# Patient Record
Sex: FEMALE | Race: WHITE | HISPANIC OR LATINO | URBAN - METROPOLITAN AREA
[De-identification: names, ages, dates, MRNs, and addresses within clinical notes are randomized per-mention and may not be internally consistent; named-entity substitution may affect disease eponyms.]

---

## 2021-04-30 ENCOUNTER — OFFICE VISIT (OUTPATIENT)
Dept: OBGYN CLINIC | Facility: CLINIC | Age: 36
End: 2021-04-30

## 2021-04-30 VITALS
SYSTOLIC BLOOD PRESSURE: 120 MMHG | DIASTOLIC BLOOD PRESSURE: 70 MMHG | WEIGHT: 230 LBS | BODY MASS INDEX: 34.07 KG/M2 | HEIGHT: 69 IN

## 2021-04-30 DIAGNOSIS — D50.8 IRON DEFICIENCY ANEMIA SECONDARY TO INADEQUATE DIETARY IRON INTAKE: ICD-10-CM

## 2021-04-30 DIAGNOSIS — N92.6 IRREGULAR MENSES: Primary | ICD-10-CM

## 2021-04-30 DIAGNOSIS — Z97.5 NEXPLANON IN PLACE: ICD-10-CM

## 2021-04-30 PROCEDURE — 99203 OFFICE O/P NEW LOW 30 MIN: CPT | Performed by: STUDENT IN AN ORGANIZED HEALTH CARE EDUCATION/TRAINING PROGRAM

## 2021-04-30 RX ORDER — DOCUSATE SODIUM 100 MG/1
CAPSULE, LIQUID FILLED ORAL
COMMUNITY
Start: 2021-03-04

## 2021-04-30 RX ORDER — SODIUM FLUORIDE 6 MG/ML
PASTE, DENTIFRICE DENTAL
COMMUNITY
Start: 2021-03-18

## 2021-04-30 RX ORDER — FERROUS SULFATE 325(65) MG
1 TABLET ORAL 3 TIMES DAILY
COMMUNITY
Start: 2021-03-29

## 2021-04-30 NOTE — ASSESSMENT & PLAN NOTE
- Reviewed irregular bleeding common in setting of Nexplanon  If heavy bleeding should recur, discussed option of estrogen add-back therapy with OCP x 2-3 cycles versus removal of Nexplanon and switching to another contraceptive method such as OCP or IUD  Patient does not wish to pursue medical management at this time, as cycles have improved over the past 2 months  - Return to office in 2-3 months for annual exam  Patient will keep menstrual diary until that time for review at her next visit

## 2021-04-30 NOTE — PROGRESS NOTES
[address] [facility], [address], [facility], [address] [facility] [address]    Assessment/Plan:  1  Irregular menses  Assessment & Plan:  - Reviewed irregular bleeding common in setting of Nexplanon  If heavy bleeding should recur, discussed option of estrogen add-back therapy with OCP x 2-3 cycles versus removal of Nexplanon and switching to another contraceptive method such as OCP or IUD  Patient does not wish to pursue medical management at this time, as cycles have improved over the past 2 months  - Return to office in 2-3 months for annual exam  Patient will keep menstrual diary until that time for review at her next visit  2  Nexplanon in place    3  Iron deficiency anemia secondary to inadequate dietary iron intake  Assessment & Plan:  - Patient reports adequate response to oral iron supplementation (Hgb 7 -> 10)  Will continue  Subjective:   Bonnie Guerra is a 31 y o  Benjamin Fulton   CC:   Chief Complaint   Patient presents with    Gynecologic Exam     Pt states periods have been heavy in the past three months with changing pads every hour  - now period is normal  - pt has had bariatric surgery in the past and also has Nexplanon implanted in 2019       HPI: Patient presents for discussion of management of irregular bleeding  Reports that her hemoglobin was in the 7 range when checked by her PCP  She was subsequently started on oral iron supplementation and reports that hemoglobin improved to the 10 range  She denies fatigue, dizziness, diaphoresis, palpitations  She reports that period have been irregular since placement of Nexplanon in 2019  At times prolonged and heavy  However, she has had two subjectively normal cycles most recently  Currently without bleeding  ROS: Negative except as noted in HPI    The following portions of the patient's history were reviewed and updated as appropriate:   No past medical history on file    Past Surgical History:   Procedure Laterality Date    ANKLE FRACTURE SURGERY Left 2006    LAPAROSCOPIC ARIN-EN-Y RECONSTRUCTION  05/2019     Family History   Problem Relation Age of Onset    Breast cancer Neg Hx     Ovarian cancer Neg Hx     Uterine cancer Neg Hx     Colon cancer Neg Hx      Social History     Socioeconomic History    Marital status: Single     Spouse name: None    Number of children: None    Years of education: None    Highest education level: None   Occupational History    None   Social Needs    Financial resource strain: Not hard at all   [address] insecurity     Worry: Never true     Inability: Never true    Transportation needs     Medical: No     Non-medical: No   Tobacco Use    Smoking status: Never Smoker    Smokeless tobacco: Never Used   Substance and Sexual Activity    Alcohol use: Yes     Frequency: Monthly or less     Drinks per session: 1 or 2     Binge frequency: Never    Drug use: Never    Sexual activity: Yes     Partners: Male     Birth control/protection: Implant   Lifestyle    Physical activity     Days per week: 0 days     Minutes per session: 0 min    Stress: Not at all   Relationships    Social connections     Talks on phone: More than three times a week     Gets together: More than three times a week     Attends Jewish service: Never     Active member of club or organization: No     Attends meetings of clubs or organizations: Never     Relationship status: Never     Intimate partner violence     Fear of current or ex partner: No     Emotionally abused: No     Physically abused: No     Forced sexual activity: No   Other Topics Concern    None   Social History Narrative    None     No outpatient medications have been marked as taking for the 4/30/21 encounter (Office Visit) with Jack Ahuja MD      Allergies   Allergen Reactions    Shrimp (Diagnostic) - Food Allergy Facial Swelling           Objective:  /70   Ht 5' 9" (1 753 m)   Wt 104 kg (230 lb)   LMP 04/12/2021   BMI 33 97 kg/m² General Appearance: alert and oriented, in no acute distress     Skin: normal, no rash or abnormalities  Neurologic: alert, oriented x3  Psychiatric: Appropriate affect, mood stable, cooperative with exam         Leighann Shafer MD  4/30/2021 1:35 PM

## 2021-06-28 ENCOUNTER — ANNUAL EXAM (OUTPATIENT)
Dept: OBGYN CLINIC | Facility: CLINIC | Age: 36
End: 2021-06-28

## 2021-06-28 VITALS
WEIGHT: 230.4 LBS | SYSTOLIC BLOOD PRESSURE: 130 MMHG | DIASTOLIC BLOOD PRESSURE: 84 MMHG | BODY MASS INDEX: 34.13 KG/M2 | HEIGHT: 69 IN

## 2021-06-28 DIAGNOSIS — Z12.4 SCREENING FOR CERVICAL CANCER: Primary | ICD-10-CM

## 2021-06-28 PROCEDURE — 99395 PREV VISIT EST AGE 18-39: CPT | Performed by: STUDENT IN AN ORGANIZED HEALTH CARE EDUCATION/TRAINING PROGRAM

## 2021-06-28 RX ORDER — ETONOGESTREL 68 MG/1
68 IMPLANT SUBCUTANEOUS ONCE
COMMUNITY
Start: 2019-10-14

## 2021-06-28 RX ORDER — PREDNISOLONE ACETATE 10 MG/ML
SUSPENSION/ DROPS OPHTHALMIC
COMMUNITY
Start: 2021-05-26

## 2021-06-28 NOTE — PROGRESS NOTES
[id number]   [address] [facility] [address], [place], [place]    ASSESSMENT/PLAN: Edenilson Parsons is a 31 y o  Carmelita Burnette who presents for annual gynecologic exam     Encounter for routine gynecologic examination  - Routine well woman exam completed today  - Cervical Cancer Screening: Current ASCCP Guidelines reviewed  Last Pap: Pt unsure  History of abnormal: None  - HPV Vaccination status: Not immunized  - STI screening offered including HIV testing: offered, pt declined  - Contraceptive counseling discussed  Current contraception: Implant:   - The following were reviewed in today's visit: breast self exam, exercise and healthy diet    Additional problems addressed during this visit:  1  Screening for cervical cancer  -     Thinprep Tis Pap and HPV mRNA E6/E7 Reflex HPV 16,18/45        CC:  Annual Gynecologic Examination    HPI: Edenilson Parsons is a 31 y o  Carmelita Burnette who presents for annual gynecologic examination  ROS: Negative except as noted in HPI    Patient's last menstrual period was 06/09/2021  Menstrual History  Menarche Age: 17 years  Period Cycle (Days): 28  Period Pattern: Regular  Menstrual Flow: Moderate  Menstrual Control: Tampon  Dysmenorrhea: (!) Moderate    She  reports being sexually active and has had partner(s) who are Male  She reports using the following method of birth control/protection: Implant    Sexual History  Sexual Assault: No  Number of Partners in Last Year: 1 years  Sexually Transmitted Infection History:  (Trichomonas)  Multiple Sex Partners: No    The following portions of the patient's history were reviewed and updated as appropriate:   Past Medical History:   Diagnosis Date    COVID-19 04/09/2021     Past Surgical History:   Procedure Laterality Date    ANKLE FRACTURE SURGERY Left 2006    GASTRIC BYPASS  05/2019    LAPAROSCOPIC ARIN-EN-Y RECONSTRUCTION  05/2019     Family History   Problem Relation Age of Onset    Breast cancer Neg Hx     Ovarian cancer Neg Hx  Uterine cancer Neg Hx     Colon cancer Neg Hx      Social History     Socioeconomic History    Marital status: Single     Spouse name: None    Number of children: None    Years of education: None    Highest education level: None   Occupational History    None   Tobacco Use    Smoking status: Never Smoker    Smokeless tobacco: Never Used   Vaping Use    Vaping Use: Never used   Substance and Sexual Activity    Alcohol use: Yes     Comment: social    Drug use: Never    Sexual activity: Yes     Partners: Male     Birth control/protection: Implant   Other Topics Concern    None   Social History Narrative    None     Social Determinants of Health     Financial Resource Strain: Low Risk     Difficulty of Paying Living Expenses: Not hard at all   Food Insecurity: No Food Insecurity    Worried About Running Out of Food in the Last Year: Never true    Irma of Food in the Last Year: Never true   Transportation Needs: No Transportation Needs    Lack of Transportation (Medical): No    Lack of Transportation (Non-Medical):  No   Physical Activity: Inactive    Days of Exercise per Week: 0 days    Minutes of Exercise per Session: 0 min   Stress: No Stress Concern Present    Feeling of Stress : Not at all   Social Connections: Socially Isolated    Frequency of Communication with Friends and Family: More than three times a week    Frequency of Social Gatherings with Friends and Family: More than three times a week    Attends Congregation Services: Never    Active Member of Clubs or Organizations: No    Attends Club or Organization Meetings: Never    Marital Status: Never    Intimate Partner Violence: Not At Risk    Fear of Current or Ex-Partner: No    Emotionally Abused: No    Physically Abused: No    Sexually Abused: No     Outpatient Medications Marked as Taking for the 6/28/21 encounter (Annual Exam) with Huong Alvarado MD   Medication     MG capsule    etonogestrel (Nexplanon) subdermal implant    FeroSul 325 (65 Fe) MG tablet    prednisoLONE acetate (PRED FORTE) 1 % ophthalmic suspension    PreviDent 5000 Booster Plus 1 1 % PSTE     Allergies   Allergen Reactions    Shrimp (Diagnostic) - Food Allergy Facial Swelling           Objective:  /84   Ht 5' 9 25" (1 759 m)   Wt 105 kg (230 lb 6 4 oz)   LMP 06/09/2021   Breastfeeding No   BMI 33 78 kg/m²        Chaperone present? Yes: Lindsey Quitman  General Appearance: alert and oriented, in no acute distress  Neck/Thyroid: No thyromegaly, no thyroid nodules  Respiratory: Unlabored breathing  Cardiovascular: Regular rate, no peripheral edema  Abdomen: Soft, non-tender, non-distended, no masses, no rebound or guarding  Breast Exam: No dimpling, nipple retraction or discharge  No lumps or masses  No axillary or supraclavicular nodes  Pelvic:       External genitalia: Normal appearance, no abnormal pigmentation, no lesions or masses  Normal Bartholin's and [place]'s  Urinary system: Urethral meatus normal, bladder non-tender  Vaginal: normal mucosa without prolapse or lesions  Normal-appearing physiologic discharge  Cervix: Normal-appearing, well-epithelialized, no gross lesions or masses  No cervical motion tenderness  Adnexa: No adnexal masses or tenderness noted  Uterus: Normal-sized, regular contour, midline, mobile, no uterine tenderness  Extremities: Normal range of motion  Warm, well-perfused, non-tender     Skin: normal, no rash or abnormalities  Neurologic: alert, oriented x3  Psychiatric: Appropriate affect, mood stable, cooperative with exam         Roseann Alford MD  6/28/2021 1:17 PM

## 2021-06-30 ENCOUNTER — TELEPHONE (OUTPATIENT)
Dept: OBGYN CLINIC | Facility: CLINIC | Age: 36
End: 2021-06-30

## 2021-06-30 LAB
CLINICAL INFO: ABNORMAL
CYTO CVX: ABNORMAL
DATE PREVIOUS BX: ABNORMAL
HPV E6+E7 MRNA CVX QL NAA+PROBE: NOT DETECTED
LMP START DATE: ABNORMAL
MICROORGANISM CVX/VAG CYTO: ABNORMAL
SL AMB PREV. PAP:: ABNORMAL
SPECIMEN SOURCE CVX/VAG CYTO: ABNORMAL

## 2024-01-02 NOTE — PROGRESS NOTES
Assessment/Plan:    Irregular menses  Menorrhagia with anemia noted in ER. Nexplanon ineffective, Mirena was rejected. Currently on OCPs with lighter flow.   History reviewed, s/p bariatric surgery (380 lb prior, now 243 lbs). Discussed concern with increased risk of neoplasia with obesity.   Will check TSH, CBC and u/s and RTO after testing for probable biopsy.   Bleeding management discussed. Seems to respond to high dose OCPs, may benefit from transdermal patch due to malabsorption with bariatric surgery. Aware of R&B of OCPs and patch including increased risk of DVT/PE.   Interested in patch, rx sent to use after completed OCP course for 21 days of light flow.        Diagnoses and all orders for this visit:    Pregnancy examination or test, negative result  -     POCT urine HCG    Irregular menses  -     TSH, 3rd generation with Free T4 reflex; Future  -     CBC and differential; Future  -     US pelvis complete w transvaginal; Future  -     norelgestromin-ethinyl estradiol (ORTHO EVRA) 150-35 MCG/24HR; Place 1 patch on the skin over 7 days once a week    Other iron deficiency anemia  -     CBC and differential; Future    Other orders  -     Discontinue: Nortrel 1/35, 28, 1-35 MG-MCG per tablet; TAKE 1 TABLET BY MOUTH EVERY 8 HOURS FOR 7 DAYS THEN TAKE 1 TABLET BY MOUTH DAILY. DO NOT USE PLACEBOS UNTIL ON SECOND PACK          Subjective:      Patient ID: Jacquie Ardon is a 38 y.o. female.    HPI G0 last seen 2021 here after ER visit at [facility] in October with heavy bleeding, anemia. She was started on high dose OCPs and bleeding stopped. [facility] subsequently where Nexplanon was replaced with Mirena in Oct. After six weeks had very heavy bleeding around 12/24, IUD was rejected (saw device). Had left over OCPs, started every 8 hours as before, stopped bleeding and now on daily dose with persistent but light flow.     The following portions of the patient's history were reviewed and updated as  "appropriate: She  has a past medical history of Anemia (March 2021), BMI 35.0-35.9,adult, and COVID-19 (04/09/2021).  She   Patient Active Problem List    Diagnosis Date Noted    Nexplanon in place 04/30/2021    Irregular menses 04/30/2021    Iron deficiency anemia secondary to inadequate dietary iron intake 04/30/2021     She  has a past surgical history that includes LAPAROSCOPIC ARIN-EN-Y RECONSTRUCTION (05/2019); Ankle fracture surgery (Left, 2006); Gastric bypass (05/2019); Bariatric Surgery (May 2019); and Cosmetic surgery (2022).  Her family history includes Fibroids in her mother.  She  reports that she has never smoked. She has never used smokeless tobacco. She reports current alcohol use of about 2.0 standard drinks of alcohol per week. She reports that she does not use drugs.  Current Outpatient Medications   Medication Sig Dispense Refill    FeroSul 325 (65 Fe) MG tablet Take 1 tablet by mouth 3 (three) times a day      norelgestromin-ethinyl estradiol (ORTHO EVRA) 150-35 MCG/24HR Place 1 patch on the skin over 7 days once a week 3 patch 12     No current facility-administered medications for this visit.     She is allergic to shrimp (diagnostic) - food allergy..    Review of Systems  +heavy, irregular menses    Objective:      /70   Ht 5' 9\" (1.753 m)   Wt 110 kg (243 lb 6.4 oz)   LMP 12/24/2023 (Exact Date)   Breastfeeding No   BMI 35.94 kg/m²          Physical Exam    General appearance: no distress, pleasant  Abdomen: soft, non tender, no palpable masses, s/p abdominoplasty  Pelvic exam: normal external genitalia, urethral meatus normal, vagina without lesions, old clotted blood at cervix, no active bleeding, uterus small, non tender, no adnexal masses, non tender  Rectal exam: deferred    "

## 2024-01-05 ENCOUNTER — OFFICE VISIT (OUTPATIENT)
Dept: OBGYN CLINIC | Facility: CLINIC | Age: 39
End: 2024-01-05

## 2024-01-05 VITALS
HEIGHT: 69 IN | SYSTOLIC BLOOD PRESSURE: 122 MMHG | DIASTOLIC BLOOD PRESSURE: 70 MMHG | BODY MASS INDEX: 36.05 KG/M2 | WEIGHT: 243.4 LBS

## 2024-01-05 DIAGNOSIS — Z32.02 PREGNANCY EXAMINATION OR TEST, NEGATIVE RESULT: ICD-10-CM

## 2024-01-05 DIAGNOSIS — N92.6 IRREGULAR MENSES: Primary | ICD-10-CM

## 2024-01-05 DIAGNOSIS — D50.8 OTHER IRON DEFICIENCY ANEMIA: ICD-10-CM

## 2024-01-05 LAB — SL AMB POCT URINE HCG: NEGATIVE

## 2024-01-05 PROCEDURE — 99213 OFFICE O/P EST LOW 20 MIN: CPT | Performed by: OBSTETRICS & GYNECOLOGY

## 2024-01-05 PROCEDURE — 81025 URINE PREGNANCY TEST: CPT | Performed by: OBSTETRICS & GYNECOLOGY

## 2024-01-05 RX ORDER — NORETHINDRONE AND ETHINYL ESTRADIOL 1 MG-35MCG
KIT ORAL
COMMUNITY
Start: 2023-12-28 | End: 2024-01-05 | Stop reason: ALTCHOICE

## 2024-01-05 NOTE — PATIENT INSTRUCTIONS
Continue with the pills you have until your next cycle (around 1/24). When the next period starts you can start the birth control patch.   Call [facility] to see what the ultrasound and blood work cost will be and I will contact you with those results.

## 2024-01-05 NOTE — ASSESSMENT & PLAN NOTE
Menorrhagia with anemia noted in ER. Nexplanon ineffective, Mirena was rejected. Currently on OCPs with lighter flow.   History reviewed, s/p bariatric surgery (380 lb prior, now 243 lbs). Discussed concern with increased risk of neoplasia with obesity.   Will check TSH, CBC and u/s and RTO after testing for probable biopsy.   Bleeding management discussed. Seems to respond to high dose OCPs, may benefit from transdermal patch due to malabsorption with bariatric surgery. Aware of R&B of OCPs and patch including increased risk of DVT/PE.   Interested in patch, rx sent to use after completed OCP course for 21 days of light flow.

## 2024-01-05 NOTE — LETTER
January 5, 2024     Louisa Pete DO  [phone]  [facility] [address]    Patient: Jacquie Ardon   YOB: 1985   Date of Visit: 1/5/2024       Dear Dr. Pete:    Thank you for referring Jacquie Ardon to me for evaluation. Below are my notes for this consultation.    If you have questions, please do not hesitate to call me. I look forward to following your patient along with you.         Sincerely,        Fara Thompson MD        CC: No Recipients    Fara Thompson MD  1/5/2024  1:51 PM  Sign when Signing Visit  Assessment/Plan:    Irregular menses  Menorrhagia with anemia noted in ER. Nexplanon ineffective, Mirena was rejected. Currently on OCPs with lighter flow.   History reviewed, s/p bariatric surgery (380 lb prior, now 243 lbs). Discussed concern with increased risk of neoplasia with obesity.   Will check TSH, CBC and u/s and RTO after testing for probable biopsy.   Bleeding management discussed. Seems to respond to high dose OCPs, may benefit from transdermal patch due to malabsorption with bariatric surgery. Aware of R&B of OCPs and patch including increased risk of DVT/PE.   Interested in patch, rx sent to use after completed OCP course for 21 days of light flow.        Diagnoses and all orders for this visit:    Pregnancy examination or test, negative result  -     POCT urine HCG    Irregular menses  -     TSH, 3rd generation with Free T4 reflex; Future  -     CBC and differential; Future  -     US pelvis complete w transvaginal; Future  -     norelgestromin-ethinyl estradiol (ORTHO EVRA) 150-35 MCG/24HR; Place 1 patch on the skin over 7 days once a week    Other iron deficiency anemia  -     CBC and differential; Future    Other orders  -     Discontinue: Nortrel 1/35, 28, 1-35 MG-MCG per tablet; TAKE 1 TABLET BY MOUTH EVERY 8 HOURS FOR 7 DAYS THEN TAKE 1 TABLET BY MOUTH DAILY. DO NOT USE PLACEBOS UNTIL ON SECOND PACK          Subjective:      Patient ID: Jacquie Ardon is a 38 y.o.  "female.    HPI G0 last seen 2021 here after ER visit at [facility] in October with heavy bleeding, anemia. She was started on high dose OCPs and bleeding stopped. [facility] where Nexplanon was replaced with Mirena in Oct. After six weeks had very heavy bleeding around 12/24, IUD was rejected (saw device). Had left over OCPs, started every 8 hours as before, stopped bleeding and now on daily dose with persistent but light flow.     The following portions of the patient's history were reviewed and updated as appropriate: She  has a past medical history of Anemia (March 2021), BMI 35.0-35.9,adult, and COVID-19 (04/09/2021).  She   Patient Active Problem List    Diagnosis Date Noted   • Nexplanon in place 04/30/2021   • Irregular menses 04/30/2021   • Iron deficiency anemia secondary to inadequate dietary iron intake 04/30/2021     She  has a past surgical history that includes LAPAROSCOPIC ARIN-EN-Y RECONSTRUCTION (05/2019); Ankle fracture surgery (Left, 2006); Gastric bypass (05/2019); Bariatric Surgery (May 2019); and Cosmetic surgery (2022).  Her family history includes Fibroids in her mother.  She  reports that she has never smoked. She has never used smokeless tobacco. She reports current alcohol use of about 2.0 standard drinks of alcohol per week. She reports that she does not use drugs.  Current Outpatient Medications   Medication Sig Dispense Refill   • FeroSul 325 (65 Fe) MG tablet Take 1 tablet by mouth 3 (three) times a day     • norelgestromin-ethinyl estradiol (ORTHO EVRA) 150-35 MCG/24HR Place 1 patch on the skin over 7 days once a week 3 patch 12     No current facility-administered medications for this visit.     She is allergic to shrimp (diagnostic) - food allergy..    Review of Systems  +heavy, irregular menses    Objective:      /70   Ht 5' 9\" (1.753 m)   Wt 110 kg (243 lb 6.4 oz)   LMP 12/24/2023 (Exact Date)   Breastfeeding No   BMI 35.94 kg/m²          " Physical Exam    General appearance: no distress, pleasant  Abdomen: soft, non tender, no palpable masses, s/p abdominoplasty  Pelvic exam: normal external genitalia, urethral meatus normal, vagina without lesions, old clotted blood at cervix, no active bleeding, uterus small, non tender, no adnexal masses, non tender  Rectal exam: deferred

## 2024-01-17 ENCOUNTER — HOSPITAL ENCOUNTER (OUTPATIENT)
Dept: ULTRASOUND IMAGING | Facility: HOSPITAL | Age: 39
Discharge: HOME | End: 2024-01-17
Attending: OBSTETRICS & GYNECOLOGY
Payer: COMMERCIAL

## 2024-01-17 DIAGNOSIS — N92.6 IRREGULAR MENSES: ICD-10-CM

## 2024-01-17 PROCEDURE — 76830 TRANSVAGINAL US NON-OB: CPT

## 2024-01-17 PROCEDURE — 76856 US EXAM PELVIC COMPLETE: CPT

## 2024-01-18 ENCOUNTER — APPOINTMENT (OUTPATIENT)
Dept: LAB | Facility: HOSPITAL | Age: 39
End: 2024-01-18
Payer: COMMERCIAL

## 2024-01-18 DIAGNOSIS — N92.6 IRREGULAR MENSES: ICD-10-CM

## 2024-01-18 DIAGNOSIS — D50.8 OTHER IRON DEFICIENCY ANEMIA: ICD-10-CM

## 2024-01-18 LAB
BASOPHILS # BLD AUTO: 0.04 THOUSANDS/ÂΜL (ref 0–0.1)
BASOPHILS NFR BLD AUTO: 1 % (ref 0–1)
EOSINOPHIL # BLD AUTO: 0.29 THOUSAND/ÂΜL (ref 0–0.61)
EOSINOPHIL NFR BLD AUTO: 3 % (ref 0–6)
ERYTHROCYTE [DISTWIDTH] IN BLOOD BY AUTOMATED COUNT: 16.1 % (ref 11.6–15.1)
HCT VFR BLD AUTO: 27.2 % (ref 34.8–46.1)
HGB BLD-MCNC: 8 G/DL (ref 11.5–15.4)
IMM GRANULOCYTES # BLD AUTO: 0.03 THOUSAND/UL (ref 0–0.2)
IMM GRANULOCYTES NFR BLD AUTO: 0 % (ref 0–2)
LYMPHOCYTES # BLD AUTO: 2.76 THOUSANDS/ÂΜL (ref 0.6–4.47)
LYMPHOCYTES NFR BLD AUTO: 32 % (ref 14–44)
MCH RBC QN AUTO: 23.6 PG (ref 26.8–34.3)
MCHC RBC AUTO-ENTMCNC: 29.4 G/DL (ref 31.4–37.4)
MCV RBC AUTO: 80 FL (ref 82–98)
MONOCYTES # BLD AUTO: 0.42 THOUSAND/ÂΜL (ref 0.17–1.22)
MONOCYTES NFR BLD AUTO: 5 % (ref 4–12)
NEUTROPHILS # BLD AUTO: 5.22 THOUSANDS/ÂΜL (ref 1.85–7.62)
NEUTS SEG NFR BLD AUTO: 59 % (ref 43–75)
NRBC BLD AUTO-RTO: 0 /100 WBCS
PLATELET # BLD AUTO: 391 THOUSANDS/UL (ref 149–390)
PMV BLD AUTO: 11.4 FL (ref 8.9–12.7)
RBC # BLD AUTO: 3.39 MILLION/UL (ref 3.81–5.12)
TSH SERPL DL<=0.05 MIU/L-ACNC: 1.62 UIU/ML (ref 0.45–4.5)
WBC # BLD AUTO: 8.76 THOUSAND/UL (ref 4.31–10.16)

## 2024-01-18 PROCEDURE — 36415 COLL VENOUS BLD VENIPUNCTURE: CPT

## 2024-01-18 PROCEDURE — 84443 ASSAY THYROID STIM HORMONE: CPT

## 2024-01-18 PROCEDURE — 85025 COMPLETE CBC W/AUTO DIFF WBC: CPT

## 2024-01-19 ENCOUNTER — TELEPHONE (OUTPATIENT)
Dept: OBGYN CLINIC | Facility: CLINIC | Age: 39
End: 2024-01-19

## 2024-01-19 DIAGNOSIS — D50.8 IRON DEFICIENCY ANEMIA SECONDARY TO INADEQUATE DIETARY IRON INTAKE: Primary | ICD-10-CM

## 2024-01-19 RX ORDER — SODIUM CHLORIDE 9 MG/ML
20 INJECTION, SOLUTION INTRAVENOUS ONCE
Status: CANCELLED | OUTPATIENT
Start: 2024-01-22

## 2024-01-19 RX ORDER — CYANOCOBALAMIN 1000 UG/ML
1000 INJECTION, SOLUTION INTRAMUSCULAR; SUBCUTANEOUS ONCE
Status: CANCELLED | OUTPATIENT
Start: 2024-01-22 | End: 2024-01-22

## 2024-01-19 NOTE — TELEPHONE ENCOUNTER
Spoke with Jacquie. CBC resulted to day with Hg 8.0, HCT 27.2; normal TSH. U/s reading pending.  Bleeding stopped on OCPs, end of pack 3 days ago with light menses starting. Recommend starting contraceptive patch today.   Has iron supplement from PCP, while prescribed three times daily due to constipation taking twice daily.   Reports h/o Hg 8 in past and even lower.   Offered iron infusion therapy, interested.  Will order therapy plan.     Spoke with Jacquie. Ordered B12 and iron studies, will check with [facility] for out of pocket expense and go for testing prior to scheduling infusion.       I ordered a new Therapy Plan (weekly).  I specified the location of the infusion in the Treatment Department field. I will instruct the patient to call and schedule her first infusion after her iron studies are returned.

## 2024-01-23 ENCOUNTER — HOSPITAL ENCOUNTER (EMERGENCY)
Facility: HOSPITAL | Age: 39
Discharge: HOME | End: 2024-01-23
Attending: EMERGENCY MEDICINE
Payer: COMMERCIAL

## 2024-01-23 VITALS
OXYGEN SATURATION: 100 % | SYSTOLIC BLOOD PRESSURE: 130 MMHG | DIASTOLIC BLOOD PRESSURE: 66 MMHG | TEMPERATURE: 98 F | HEART RATE: 56 BPM | RESPIRATION RATE: 18 BRPM

## 2024-01-23 DIAGNOSIS — N92.1 MENORRHAGIA WITH IRREGULAR CYCLE: ICD-10-CM

## 2024-01-23 DIAGNOSIS — D64.9 SYMPTOMATIC ANEMIA: Primary | ICD-10-CM

## 2024-01-23 LAB
ABO GROUP BLD: NORMAL
ABO GROUP BLD: NORMAL
ALBUMIN SERPL BCP-MCNC: 3.9 G/DL (ref 3.5–5)
ALP SERPL-CCNC: 68 U/L (ref 34–104)
ALT SERPL W P-5'-P-CCNC: 9 U/L (ref 7–52)
ANION GAP SERPL CALCULATED.3IONS-SCNC: 5 MMOL/L
AST SERPL W P-5'-P-CCNC: 11 U/L (ref 13–39)
BASOPHILS # BLD AUTO: 0.05 THOUSANDS/ÂΜL (ref 0–0.1)
BASOPHILS NFR BLD AUTO: 1 % (ref 0–1)
BILIRUB SERPL-MCNC: 0.2 MG/DL (ref 0.2–1)
BLD GP AB SCN SERPL QL: NEGATIVE
BUN SERPL-MCNC: 16 MG/DL (ref 5–25)
CALCIUM SERPL-MCNC: 8.9 MG/DL (ref 8.4–10.2)
CHLORIDE SERPL-SCNC: 107 MMOL/L (ref 96–108)
CO2 SERPL-SCNC: 25 MMOL/L (ref 21–32)
CREAT SERPL-MCNC: 0.71 MG/DL (ref 0.6–1.3)
EOSINOPHIL # BLD AUTO: 0.26 THOUSAND/ÂΜL (ref 0–0.61)
EOSINOPHIL NFR BLD AUTO: 3 % (ref 0–6)
ERYTHROCYTE [DISTWIDTH] IN BLOOD BY AUTOMATED COUNT: 15.8 % (ref 11.6–15.1)
EXT PREGNANCY TEST URINE: NEGATIVE
EXT. CONTROL: NORMAL
GFR SERPL CREATININE-BSD FRML MDRD: 109 ML/MIN/1.73SQ M
GLUCOSE SERPL-MCNC: 89 MG/DL (ref 65–140)
HCT VFR BLD AUTO: 26.8 % (ref 34.8–46.1)
HGB BLD-MCNC: 7.7 G/DL (ref 11.5–15.4)
IMM GRANULOCYTES # BLD AUTO: 0.05 THOUSAND/UL (ref 0–0.2)
IMM GRANULOCYTES NFR BLD AUTO: 1 % (ref 0–2)
LYMPHOCYTES # BLD AUTO: 2.62 THOUSANDS/ÂΜL (ref 0.6–4.47)
LYMPHOCYTES NFR BLD AUTO: 28 % (ref 14–44)
MCH RBC QN AUTO: 23.4 PG (ref 26.8–34.3)
MCHC RBC AUTO-ENTMCNC: 28.7 G/DL (ref 31.4–37.4)
MCV RBC AUTO: 82 FL (ref 82–98)
MONOCYTES # BLD AUTO: 0.52 THOUSAND/ÂΜL (ref 0.17–1.22)
MONOCYTES NFR BLD AUTO: 6 % (ref 4–12)
NEUTROPHILS # BLD AUTO: 5.71 THOUSANDS/ÂΜL (ref 1.85–7.62)
NEUTS SEG NFR BLD AUTO: 61 % (ref 43–75)
NRBC BLD AUTO-RTO: 0 /100 WBCS
PLATELET # BLD AUTO: 375 THOUSANDS/UL (ref 149–390)
PMV BLD AUTO: 10.8 FL (ref 8.9–12.7)
POTASSIUM SERPL-SCNC: 4.2 MMOL/L (ref 3.5–5.3)
PROT SERPL-MCNC: 6.8 G/DL (ref 6.4–8.4)
RBC # BLD AUTO: 3.29 MILLION/UL (ref 3.81–5.12)
RH BLD: NEGATIVE
RH BLD: NEGATIVE
SODIUM SERPL-SCNC: 137 MMOL/L (ref 135–147)
SPECIMEN EXPIRATION DATE: NORMAL
WBC # BLD AUTO: 9.21 THOUSAND/UL (ref 4.31–10.16)

## 2024-01-23 PROCEDURE — 86901 BLOOD TYPING SEROLOGIC RH(D): CPT | Performed by: PHYSICIAN ASSISTANT

## 2024-01-23 PROCEDURE — 81025 URINE PREGNANCY TEST: CPT | Performed by: PHYSICIAN ASSISTANT

## 2024-01-23 PROCEDURE — 85025 COMPLETE CBC W/AUTO DIFF WBC: CPT | Performed by: PHYSICIAN ASSISTANT

## 2024-01-23 PROCEDURE — 86900 BLOOD TYPING SEROLOGIC ABO: CPT | Performed by: PHYSICIAN ASSISTANT

## 2024-01-23 PROCEDURE — 99284 EMERGENCY DEPT VISIT MOD MDM: CPT

## 2024-01-23 PROCEDURE — 36430 TRANSFUSION BLD/BLD COMPNT: CPT

## 2024-01-23 PROCEDURE — 80053 COMPREHEN METABOLIC PANEL: CPT | Performed by: PHYSICIAN ASSISTANT

## 2024-01-23 PROCEDURE — 36415 COLL VENOUS BLD VENIPUNCTURE: CPT | Performed by: PHYSICIAN ASSISTANT

## 2024-01-23 PROCEDURE — 99285 EMERGENCY DEPT VISIT HI MDM: CPT | Performed by: PHYSICIAN ASSISTANT

## 2024-01-23 PROCEDURE — P9016 RBC LEUKOCYTES REDUCED: HCPCS

## 2024-01-23 PROCEDURE — 96365 THER/PROPH/DIAG IV INF INIT: CPT

## 2024-01-23 PROCEDURE — 86920 COMPATIBILITY TEST SPIN: CPT

## 2024-01-23 PROCEDURE — 86850 RBC ANTIBODY SCREEN: CPT | Performed by: PHYSICIAN ASSISTANT

## 2024-01-23 RX ADMIN — SODIUM CHLORIDE, SODIUM LACTATE, POTASSIUM CHLORIDE, AND CALCIUM CHLORIDE 1000 ML: .6; .31; .03; .02 INJECTION, SOLUTION INTRAVENOUS at 15:23

## 2024-01-23 NOTE — TELEPHONE ENCOUNTER
Patient left v/m reporting increased bleeding and requesting a call back.  Patient was seen on 1/19/24 and states she has been bleeding since then.  She reports heavier bleeding yesterday and today, and soaking through an ultra tampon every hour today. Pt advised to convert to wearing pads.  She denies any light-headedness, SOB, faintness. Advised to report to the ER if develops any SOB, faintness, light-headedness.  She verbalized understanding.  Pt states she has tried patch but feels it is not working.  Dr. Thompson, please advise for patient's next steps.

## 2024-01-23 NOTE — ED PROVIDER NOTES
History  Chief Complaint   Patient presents with    Vaginal Bleeding     Pt is having a heavy period with hxn of anemia. Pt had blood work done a week ago which showed her hemoglobin was 8.0. pt called pcp and was referred here for blood transfer. Pt reports mild dizziness early in day. Pt denies sob     Patient is a 38-year-old female with a past medical history significant for menorrhagia and history of anemia requiring transfusion who presents for evaluation of worsening vaginal bleeding and abnormal labs.  Patient was previously evaluated in the ED a few months ago and found to be anemic.  At that time she was given Provera and followed up with GYN.  They trialed Nexplanon and Mirena which was rejected.  She recently started on a birth control patch for control of her bleeding.  She states that earlier today she was going through 1 heavy flow tampon every hour but that she took some ibuprofen and the bleeding has improved.  She reports lightheadedness when she moves too fast and intermittent dizziness.  She denies shortness of breath.      Vaginal Bleeding  Associated symptoms: dizziness    Associated symptoms: no abdominal pain, no back pain, no dysuria and no fever        Prior to Admission Medications   Prescriptions Last Dose Informant Patient Reported? Taking?   FeroSul 325 (65 Fe) MG tablet   Yes No   Sig: Take 1 tablet by mouth 3 (three) times a day   norelgestromin-ethinyl estradiol (ORTHO EVRA) 150-35 MCG/24HR   No No   Sig: Place 1 patch on the skin over 7 days once a week      Facility-Administered Medications: None       Past Medical History:   Diagnosis Date    Anemia March 2021    BMI 35.0-35.9,adult     highest 380 prior to bariatric    COVID-19 04/09/2021       Past Surgical History:   Procedure Laterality Date    ANKLE FRACTURE SURGERY Left 2006    BARIATRIC SURGERY  May 2019    COSMETIC SURGERY  2022    Abdominplasty and back lift    GASTRIC BYPASS  05/2019    LAPAROSCOPIC ARIN-EN-Y  RECONSTRUCTION  05/2019       Family History   Problem Relation Age of Onset    Fibroids Mother         hysterectomy    Breast cancer Neg Hx     Ovarian cancer Neg Hx     Uterine cancer Neg Hx     Colon cancer Neg Hx      I have reviewed and agree with the history as documented.    E-Cigarette/Vaping    E-Cigarette Use Current Some Day User      E-Cigarette/Vaping Substances    Nicotine Yes     THC No     CBD No     Flavoring Yes     Other No     Unknown No      Social History     Tobacco Use    Smoking status: Never    Smokeless tobacco: Never   Vaping Use    Vaping status: Some Days    Substances: Nicotine, Flavoring   Substance Use Topics    Alcohol use: Yes     Alcohol/week: 2.0 standard drinks of alcohol     Types: 2 Cans of beer per week     Comment: social    Drug use: Never       Review of Systems   Constitutional: Negative.  Negative for chills and fever.   HENT: Negative.  Negative for ear pain and sore throat.    Eyes: Negative.  Negative for pain and visual disturbance.   Respiratory: Negative.  Negative for cough and shortness of breath.    Cardiovascular:  Negative for chest pain and palpitations.   Gastrointestinal: Negative.  Negative for abdominal pain and vomiting.   Endocrine: Negative.    Genitourinary:  Positive for vaginal bleeding. Negative for dysuria and hematuria.   Musculoskeletal: Negative.  Negative for arthralgias and back pain.   Skin: Negative.  Negative for color change and rash.   Neurological:  Positive for dizziness. Negative for seizures and syncope.   Hematological: Negative.    Psychiatric/Behavioral: Negative.     All other systems reviewed and are negative.      Physical Exam  Physical Exam  Vitals and nursing note reviewed.   Constitutional:       General: She is not in acute distress.     Appearance: Normal appearance. She is well-developed. She is not ill-appearing, toxic-appearing or diaphoretic.   HENT:      Head: Normocephalic and atraumatic.      Right Ear: External  ear normal.      Left Ear: External ear normal.      Nose: Nose normal.      Mouth/Throat:      Mouth: Mucous membranes are moist.   Eyes:      General: No scleral icterus.     Extraocular Movements: Extraocular movements intact.      Conjunctiva/sclera: Conjunctivae normal.      Pupils: Pupils are equal, round, and reactive to light.      Comments: Pale conjunctive on   Cardiovascular:      Rate and Rhythm: Normal rate and regular rhythm.      Pulses: Normal pulses.      Heart sounds: Normal heart sounds. No murmur heard.  Pulmonary:      Effort: Pulmonary effort is normal. No respiratory distress.      Breath sounds: Normal breath sounds.   Abdominal:      General: Abdomen is flat. Bowel sounds are normal.      Palpations: Abdomen is soft.      Tenderness: There is no abdominal tenderness. There is no right CVA tenderness or left CVA tenderness.   Musculoskeletal:         General: No swelling. Normal range of motion.      Cervical back: Normal range of motion and neck supple.      Right lower leg: No edema.      Left lower leg: No edema.   Skin:     General: Skin is warm and dry.      Capillary Refill: Capillary refill takes less than 2 seconds.   Neurological:      General: No focal deficit present.      Mental Status: She is alert and oriented to person, place, and time.   Psychiatric:         Mood and Affect: Mood normal.         Behavior: Behavior normal.         Vital Signs  ED Triage Vitals   Temperature Pulse Respirations Blood Pressure SpO2   01/23/24 1451 01/23/24 1450 01/23/24 1450 01/23/24 1450 01/23/24 1450   97.9 °F (36.6 °C) 80 18 163/100 100 %      Temp Source Heart Rate Source Patient Position - Orthostatic VS BP Location FiO2 (%)   01/23/24 1451 -- -- -- --   Oral          Pain Score       --                  Vitals:    01/23/24 1450 01/23/24 1731   BP: 163/100 141/72   Pulse: 80 61         Visual Acuity      ED Medications  Medications   lactated ringers bolus 1,000 mL (0 mL Intravenous Stopped  1/23/24 1623)       Diagnostic Studies  Results Reviewed       Procedure Component Value Units Date/Time    Comprehensive metabolic panel [[id number]]  (Abnormal) Collected: 01/23/24 1521    Lab Status: Final result Specimen: Blood from Arm, Left Updated: 01/23/24 1545     Sodium 137 mmol/L      Potassium 4.2 mmol/L      Chloride 107 mmol/L      CO2 25 mmol/L      ANION GAP 5 mmol/L      BUN 16 mg/dL      Creatinine 0.71 mg/dL      Glucose 89 mg/dL      Calcium 8.9 mg/dL      AST 11 U/L      ALT 9 U/L      Alkaline Phosphatase 68 U/L      Total Protein 6.8 g/dL      Albumin 3.9 g/dL      Total Bilirubin 0.20 mg/dL      eGFR 109 ml/min/1.73sq m     Narrative:      National Kidney Disease Foundation guidelines for Chronic Kidney Disease (CKD):     Stage 1 with normal or high GFR (GFR > 90 mL/min/1.73 square meters)    Stage 2 Mild CKD (GFR = 60-89 mL/min/1.73 square meters)    Stage 3A Moderate CKD (GFR = 45-59 mL/min/1.73 square meters)    Stage 3B Moderate CKD (GFR = 30-44 mL/min/1.73 square meters)    Stage 4 Severe CKD (GFR = 15-29 mL/min/1.73 square meters)    Stage 5 End Stage CKD (GFR <15 mL/min/1.73 square meters)  Note: GFR calculation is accurate only with a steady state creatinine    CBC and differential [[id number]]  (Abnormal) Collected: 01/23/24 1521    Lab Status: Final result Specimen: Blood from Arm, Left Updated: 01/23/24 1530     WBC 9.21 Thousand/uL      RBC 3.29 Million/uL      Hemoglobin 7.7 g/dL      Hematocrit 26.8 %      MCV 82 fL      MCH 23.4 pg      MCHC 28.7 g/dL      RDW 15.8 %      MPV 10.8 fL      Platelets 375 Thousands/uL      nRBC 0 /100 WBCs      Neutrophils Relative 61 %      Immat GRANS % 1 %      Lymphocytes Relative 28 %      Monocytes Relative 6 %      Eosinophils Relative 3 %      Basophils Relative 1 %      Neutrophils Absolute 5.71 Thousands/µL      Immature Grans Absolute 0.05 Thousand/uL      Lymphocytes Absolute 2.62 Thousands/µL      Monocytes Absolute 0.52 Thousand/µL       Eosinophils Absolute 0.26 Thousand/µL      Basophils Absolute 0.05 Thousands/µL     POCT pregnancy, urine [[id number]]  (Normal) Resulted: 01/23/24 1524    Lab Status: Final result Updated: 01/23/24 1524     EXT Preg Test, Ur Negative     Control Valid                   No orders to display              Procedures  Procedures         ED Course  ED Course as of 01/23/24 1742 Tue Jan 23, 2024   1531 Hemoglobin(!): 7.7   1531 Hemoglobin(!): 7.7                                             Medical Decision Making  Patient with symptomatic anemia secondary to menorrhagia  Patient transfused 1 unit packed red blood cells  At this time, pt will be followed by Dr SUSAN Veras for further evaluation and treatment.    Amount and/or Complexity of Data Reviewed  Labs: ordered. Decision-making details documented in ED Course.             Disposition  Final diagnoses:   Symptomatic anemia   Menorrhagia with irregular cycle     Time reflects when diagnosis was documented in both MDM as applicable and the Disposition within this note       Time User Action Codes Description Comment    1/23/2024  5:40 PM Ibis Manzano [D64.9] Symptomatic anemia     1/23/2024  5:41 PM Ibis Manzano [N92.1] Menorrhagia with irregular cycle           ED Disposition       None          Follow-up Information    None         Patient's Medications   Discharge Prescriptions    No medications on file       No discharge procedures on file.    PDMP Review       None            ED Provider  Electronically Signed by             Ibis Manzano PA-C  01/23/24 1742

## 2024-01-23 NOTE — TELEPHONE ENCOUNTER
Dr. Thompson notified of patient's condition and recommendations for patient to report to the ER for further evaluation.  Spoke with patient and recommendations given to report to the ER.  Pt verbalized understanding.

## 2024-01-24 ENCOUNTER — TELEPHONE (OUTPATIENT)
Dept: OBGYN CLINIC | Facility: CLINIC | Age: 39
End: 2024-01-24

## 2024-01-24 DIAGNOSIS — N92.6 IRREGULAR MENSES: Primary | ICD-10-CM

## 2024-01-24 PROBLEM — D64.9 ANEMIA: Status: ACTIVE | Noted: 2024-01-24

## 2024-01-24 LAB
ABO GROUP BLD BPU: NORMAL
BPU ID: NORMAL
CROSSMATCH: NORMAL
UNIT DISPENSE STATUS: NORMAL
UNIT PRODUCT CODE: NORMAL
UNIT PRODUCT VOLUME: 350 ML
UNIT RH: NORMAL

## 2024-01-24 RX ORDER — NORETHINDRONE AND ETHINYL ESTRADIOL 1 MG-35MCG
1 KIT ORAL DAILY
Qty: 90 TABLET | Refills: 3 | Status: SHIPPED | OUTPATIENT
Start: 2024-01-24

## 2024-01-24 NOTE — TELEPHONE ENCOUNTER
Patient called into office reporting that she went to ER and received a blood transfusion yesterday.  She reports that she woke up today with a soaked overnight maxi pad. She denies any SOB, faintness, light-headedness or currently bleeding through maxi pad every hour since being awake today.  Advised patient to get iron studies done per telephone note 1/19/24.  She states she is wearing patch but wanted to know if there was any other treatment she could do to help with the bleeding currently.  Dr. Thompson, please advise.

## 2024-01-24 NOTE — TELEPHONE ENCOUNTER
Please have her restart the pill. She can take 2 tablets daily until the bleeding stops. Refills sent to pharmacy on chart. She should have follow up in the next 1- 2 weeks (Dr Castillo or myself). Thanks.

## 2024-01-24 NOTE — TELEPHONE ENCOUNTER
Spoke with patient and instructed her to restart the pill, taking 2 tablets daily until the bleeding stops per provider's note.  Informed patient that refills were sent to pharmacy on chart. Pt advised to schedule follow up appointment in 1-2 weeks. Pt verbalized understanding and transferred to  to be scheduled.

## 2024-01-26 ENCOUNTER — APPOINTMENT (OUTPATIENT)
Dept: LAB | Age: 39
End: 2024-01-26
Payer: COMMERCIAL

## 2024-01-26 LAB
FERRITIN SERPL-MCNC: 3 NG/ML (ref 11–307)
IRON SATN MFR SERPL: 1 % (ref 15–50)
IRON SERPL-MCNC: 10 UG/DL (ref 50–212)
TIBC SERPL-MCNC: 699 UG/DL (ref 250–450)
UIBC SERPL-MCNC: 689 UG/DL (ref 155–355)
VIT B12 SERPL-MCNC: 5533 PG/ML (ref 180–914)

## 2024-01-26 PROCEDURE — 83540 ASSAY OF IRON: CPT | Performed by: OBSTETRICS & GYNECOLOGY

## 2024-01-26 PROCEDURE — 82728 ASSAY OF FERRITIN: CPT | Performed by: OBSTETRICS & GYNECOLOGY

## 2024-01-26 PROCEDURE — 36415 COLL VENOUS BLD VENIPUNCTURE: CPT | Performed by: OBSTETRICS & GYNECOLOGY

## 2024-01-26 PROCEDURE — 82607 VITAMIN B-12: CPT | Performed by: OBSTETRICS & GYNECOLOGY

## 2024-01-26 PROCEDURE — 83550 IRON BINDING TEST: CPT | Performed by: OBSTETRICS & GYNECOLOGY

## 2024-01-30 ENCOUNTER — TELEPHONE (OUTPATIENT)
Dept: OBGYN CLINIC | Facility: CLINIC | Age: 39
End: 2024-01-30

## 2024-01-30 NOTE — TELEPHONE ENCOUNTER
Spoke with pt, bleeding stopped with OCPs now on one tablet daily. Taking iron twice daily. Stopped contraceptive patch.  Iron studies reviewed, would benefit from IV iron but pt is cash paying.   Will attempt to ascertain cost for pt prior to initiating therapy plan.   U/S reviewed, will need f/u imaging (u/s discussed with her for $ vs MRI) in 3- 6 months.   Following up with Dr Castillo as scheduled next week.

## 2024-02-06 ENCOUNTER — OFFICE VISIT (OUTPATIENT)
Dept: OBGYN CLINIC | Facility: CLINIC | Age: 39
End: 2024-02-06

## 2024-02-06 VITALS
HEIGHT: 69 IN | SYSTOLIC BLOOD PRESSURE: 116 MMHG | WEIGHT: 247.8 LBS | DIASTOLIC BLOOD PRESSURE: 74 MMHG | BODY MASS INDEX: 36.7 KG/M2

## 2024-02-06 DIAGNOSIS — N93.9 ABNORMAL UTERINE BLEEDING (AUB): Primary | ICD-10-CM

## 2024-02-06 DIAGNOSIS — D50.0 CHRONIC BLOOD LOSS ANEMIA: ICD-10-CM

## 2024-02-06 DIAGNOSIS — N94.89 ADNEXAL MASS: ICD-10-CM

## 2024-02-06 DIAGNOSIS — N92.6 IRREGULAR MENSES: ICD-10-CM

## 2024-02-06 PROCEDURE — 99214 OFFICE O/P EST MOD 30 MIN: CPT | Performed by: STUDENT IN AN ORGANIZED HEALTH CARE EDUCATION/TRAINING PROGRAM

## 2024-02-06 RX ORDER — NORETHINDRONE AND ETHINYL ESTRADIOL 1 MG-35MCG
1 KIT ORAL DAILY
Qty: 90 TABLET | Refills: 3 | Status: SHIPPED | OUTPATIENT
Start: 2024-02-06

## 2024-02-06 NOTE — PROGRESS NOTES
[facility] [place]  [address], [place] [address]    Assessment/Plan:  1. Abnormal uterine bleeding (AUB)  Assessment & Plan:  - Clinical history and findings of ultrasound are suggestive of ovulatory dysfunction (AUB-O).  - Has failed management with Nexplanon (had prolonged and bothersome bleeding), LNG-IUD (expelled), patch (refractory bleeding requiring transfusion).   - Bleeding resolve with initiation of OCP twice daily, then returning to daily administration. Hopeful for continued control of bleeding. We discussed that given her bariatric surgery history, she should not rely on OCP for contraception. Safe to take for bleeding control, but recommend backup with condoms.   - Will continue OCP in continuous fashion. Rx refilled today.   - If OCP fails as long-term management, could trial oral TXA. Also discussed definitive surgical management with hysterectomy. While this isn't her first choice, we discussed that it is an option if she does not desire future childbearing.       2. Adnexal mass  Assessment & Plan:  - Suspect dermoid vs endometrioma. MRI has been discussed, but is cost prohibitive for the patient due to lack of insurance coverage. Recommend repeat pelvic ultrasound in 3-6 months. Order provided. She agrees.     Orders:  -     US pelvis complete w transvaginal; Future; Expected date: 2024    3. Chronic blood loss anemia    4. Irregular menses  -     norethindrone-ethinyl estradiol (Nortrel 1/, 28,) 1-35 MG-MCG per tablet; Take 1 tablet by mouth daily        Subjective:   Jacquie Ardon is a 38 y.o.  .  CC:   Chief Complaint   Patient presents with    Follow-up     Pt states she's on BC, even though she still spotting every now and then         HPI: Patient presents for follow up of heavy menses. She was seen in ER on  and received blood transfusion. She was restarted on OCP at that time with twice daily dosing, which improved her bleeding. She has now returned to  once daily dosing and now reports only light spotting.     Today, an ER note from 1/23/2024 was reviewed. Labs were reviewed including CBC and T&S from 1/23/2024 and iron studies from 1/26/2024. An ultrasound report from 1/17/2024 was reviewed.      ROS: Negative except as noted in HPI    Patient's last menstrual period was 01/23/2024 (exact date).       She  reports being sexually active and has had partner(s) who are male. She reports using the following method of birth control/protection: Implant.       The following portions of the patient's history were reviewed and updated as appropriate:   Past Medical History:   Diagnosis Date    Anemia 1/24/2024    BMI 35.0-35.9,adult     highest 380 prior to bariatric    COVID-19 04/09/2021     Past Surgical History:   Procedure Laterality Date    ANKLE FRACTURE SURGERY Left 2006    BARIATRIC SURGERY  May 2019    COSMETIC SURGERY  2022    Abdominplasty and back lift    GASTRIC BYPASS  05/2019    LAPAROSCOPIC ARIN-EN-Y RECONSTRUCTION  05/2019     Family History   Problem Relation Age of Onset    Fibroids Mother         hysterectomy    Breast cancer Neg Hx     Ovarian cancer Neg Hx     Uterine cancer Neg Hx     Colon cancer Neg Hx      Social History     Socioeconomic History    Marital status: Single     Spouse name: Not on file    Number of children: Not on file    Years of education: Not on file    Highest education level: Not on file   Occupational History    Not on file   Tobacco Use    Smoking status: Never    Smokeless tobacco: Never   Vaping Use    Vaping status: Some Days    Substances: Nicotine, Flavoring   Substance and Sexual Activity    Alcohol use: Yes     Alcohol/week: 2.0 standard drinks of alcohol     Types: 2 Cans of beer per week     Comment: social    Drug use: Never    Sexual activity: Yes     Partners: Male     Birth control/protection: Implant   Other Topics Concern    Not on file   Social History Narrative    Not on file     Social Determinants of  Health     Financial Resource Strain: Low Risk  (4/30/2021)    Overall Financial Resource Strain (CARDIA)     Difficulty of Paying Living Expenses: Not hard at all   Food Insecurity: No Food Insecurity (4/30/2021)    Hunger Vital Sign     Worried About Running Out of Food in the Last Year: Never true     Ran Out of Food in the Last Year: Never true   Transportation Needs: No Transportation Needs (4/30/2021)    PRAPARE - Transportation     Lack of Transportation (Medical): No     Lack of Transportation (Non-Medical): No   Physical Activity: Inactive (4/30/2021)    Exercise Vital Sign     Days of Exercise per Week: 0 days     Minutes of Exercise per Session: 0 min   Stress: No Stress Concern Present (4/30/2021)    [place] [employer] - Occupational Stress Questionnaire     Feeling of Stress : Not at all   Social Connections: Socially Isolated (4/30/2021)    Social Connection and Isolation Panel [NHANES]     Frequency of Communication with Friends and Family: More than three times a week     Frequency of Social Gatherings with Friends and Family: More than three times a week     Attends Spiritism Services: Never     Active Member of Clubs or Organizations: No     Attends Club or Organization Meetings: Never     Marital Status: Never    Intimate Partner Violence: Not At Risk (4/30/2021)    Humiliation, Afraid, Rape, and Kick questionnaire     Fear of Current or Ex-Partner: No     Emotionally Abused: No     Physically Abused: No     Sexually Abused: No   Housing Stability: Not on file     Outpatient Medications Marked as Taking for the 2/6/24 encounter (Office Visit) with Benjamin Castillo MD   Medication    FeroSul 325 (65 Fe) MG tablet    norethindrone-ethinyl estradiol (Nortrel 1/35, 28,) 1-35 MG-MCG per tablet    [DISCONTINUED] norethindrone-ethinyl estradiol (Nortrel 1/35, 28,) 1-35 MG-MCG per tablet     Allergies   Allergen Reactions    Shrimp (Diagnostic) - Food Allergy Facial Swelling          Imaging: (1/17/2024)  PELVIC ULTRASOUND, COMPLETE     INDICATION: The patient is 38 years old. N92.6: Irregular menstruation, unspecified.     COMPARISON: None     TECHNIQUE: Transabdominal pelvic ultrasound was performed in sagittal and transverse planes with a curvilinear transducer. Additional transvaginal imaging was performed to better evaluate the endometrium and ovaries. Imaging included volumetric sweeps as   well as traditional still imaging technique.     FINDINGS:     UTERUS:  The uterus is anteverted in position, measuring 9.6 x 4.3 x 5.5 cm.  The uterus has a normal contour and echotexture.  The cervix appears within normal limits.     ENDOMETRIUM:  The endometrial echo complex has an AP caliber of 3.0 mm.  This is within normal limits for a premenopausal female. Anechoic focus measuring 4 mm may represent an endometrial cyst versus trace intracavitary fluid.     OVARIES/ADNEXA:  Right ovary: 3.8 x 1.9 x 2.3 cm. 8.8 mL.  Left ovary: 4.4 x 2.9 x 1.9 cm. 12.9 mL.  Ovarian Doppler flow is within normal limits.  Complex round structure is seen within the right ovary 2.2 x 2.2 x 2.4 cm. The structure contains several hypoechoic foci as well as areas of posterior acoustic shadowing. Minimal color Doppler signal is suggested within the structure. The left ovary is   only seen on transabdominal imaging. No suspicious left ovarian abnormalities are seen within these confines.     OTHER:  No free fluid or loculated fluid collections.     IMPRESSION:     1. Indeterminate heterogeneous structure measuring 2.2 cm within the right ovary with areas of hyperechogenicity and posterior shadowing. Differential considerations include dermoid cyst, fibroma, endometrioma, versus other solid ovarian lesion. Further   characterization with MRI pelvis with and without contrast is recommended.     2. Endometrial echocomplex measures 4 mm, within normal limits for a premenopausal female. 4 mm anechoic focus along the  "endometrial stripe may represent a tiny endometrial cyst versus trace intracavitary fluid.     The study was marked in EPIC for significant notification.        Workstation performed: [id number]    Objective:  /74 (BP Location: Left arm, Patient Position: Sitting, Cuff Size: Standard)   Ht 5' 9\" (1.753 m)   Wt 112 kg (247 lb 12.8 oz)   LMP 01/23/2024 (Exact Date)   Breastfeeding No   BMI 36.59 kg/m²        General Appearance: alert and oriented, in no acute distress.   Extremities: Normal range of motion.   Skin: normal, no rash or abnormalities  Neurologic: alert, oriented x3  Psychiatric: Appropriate affect, mood stable, cooperative with exam.        Benjamin Castillo MD  2/6/2024 12:18 PM  "

## 2024-02-06 NOTE — ASSESSMENT & PLAN NOTE
- Clinical history and findings of ultrasound are suggestive of ovulatory dysfunction (AUB-O).  - Has failed management with Nexplanon (had prolonged and bothersome bleeding), LNG-IUD (expelled), patch (refractory bleeding requiring transfusion).   - Bleeding resolve with initiation of OCP twice daily, then returning to daily administration. Hopeful for continued control of bleeding. We discussed that given her bariatric surgery history, she should not rely on OCP for contraception. Safe to take for bleeding control, but recommend backup with condoms.   - Will continue OCP in continuous fashion. Rx refilled today.   - If OCP fails as long-term management, could trial oral TXA. Also discussed definitive surgical management with hysterectomy. While this isn't her first choice, we discussed that it is an option if she does not desire future childbearing.

## 2024-02-06 NOTE — ASSESSMENT & PLAN NOTE
- Suspect dermoid vs endometrioma. MRI has been discussed, but is cost prohibitive for the patient due to lack of insurance coverage. Recommend repeat pelvic ultrasound in 3-6 months. Order provided. She agrees.